# Patient Record
Sex: MALE | Race: AMERICAN INDIAN OR ALASKA NATIVE
[De-identification: names, ages, dates, MRNs, and addresses within clinical notes are randomized per-mention and may not be internally consistent; named-entity substitution may affect disease eponyms.]

---

## 2018-08-30 ENCOUNTER — HOSPITAL ENCOUNTER (EMERGENCY)
Dept: HOSPITAL 42 - ED | Age: 33
Discharge: HOME | End: 2018-08-30
Payer: COMMERCIAL

## 2018-08-30 VITALS
SYSTOLIC BLOOD PRESSURE: 130 MMHG | DIASTOLIC BLOOD PRESSURE: 70 MMHG | HEART RATE: 85 BPM | RESPIRATION RATE: 20 BRPM | TEMPERATURE: 98.4 F

## 2018-08-30 VITALS — BODY MASS INDEX: 22.6 KG/M2

## 2018-08-30 VITALS — OXYGEN SATURATION: 98 %

## 2018-08-30 DIAGNOSIS — F17.210: ICD-10-CM

## 2018-08-30 DIAGNOSIS — L02.01: Primary | ICD-10-CM

## 2018-08-30 NOTE — ED PDOC
Arrival/HPI





- General


Chief Complaint: Abnormal Skin Integrity


Time Seen by Provider: 08/30/18 17:06


Historian: Patient





- History of Present Illness


Narrative History of Present Illness (Text): 





08/30/18 17:55


33yr old male presents today with abscess to the left cheek. pt states he has 

been having on and off swelling of this area of his cheek for about one year. 

pt states this is the first time the area swelled so much. pt denies fever/

chills. denies trauma or injury. pt states he has same issue that comes and 

goes on the right cheek as well. pt states he goes to the [employer] for his 

beard. pt c/o slight pain to abscess site. no other complaints. 





Past Medical History





- Provider Review


Nursing Documentation Reviewed: Yes





- Travel History


Have you recently traveled outside [place] w/in the past 3 mons?: No





- Psychiatric


Hx Depression: No


Hx Emotional Abuse: No


Hx Physical Abuse: No


Hx Substance Use: No





- Suicidal Assessment


Feels Threatened In Home Enviroment: No





Family/Social History





- Physician Review


Nursing Documentation Reviewed: Yes


Family/Social History: Unknown Family HX


Smoking Status: Heavy Smoker > 10 Cigarettes Daily


Hx Alcohol Use: Yes (Drank tonight)


Frequency of alcohol use: Few days per week


Hx Substance Use: No


Hx Substance Use Treatment: No





Allergies/Home Meds


Allergies/Adverse Reactions: 


Allergies





No Known Allergies Allergy (Verified 08/30/18 17:19)


 











Review of Systems





- Review of Systems


Constitutional: absent: Fatigue, Fevers


Respiratory: absent: SOB, Cough


Cardiovascular: absent: Chest Pain, Palpitations


Gastrointestinal: absent: Abdominal Pain, Nausea, Vomiting


Musculoskeletal: absent: Arthralgias


Skin: Abscess


Neurological: absent: Headache, Dizziness


Psychiatric: absent: Anxiety, Depression





Physical Exam


Vital Signs Reviewed: Yes


Vital Signs











  Temp Pulse Resp BP Pulse Ox


 


 08/30/18 17:17  98.4 F  85  20  130/70  97











Temperature: Afebrile


Blood Pressure: Normal


Pulse: Regular


Respiratory Rate: Normal


Appearance: Positive for: Well-Appearing, Non-Toxic, Comfortable


Pain Distress: None


Mental Status: Positive for: Alert and Oriented X 3





- Systems Exam


Head: Present: Other (left cheek; there is a jhon sized area of swelling and 

fluctuance with small central pustule noted. no surrounding erythema; minimal 

tenderness)


Pupils: Present: PERRL


Extroacular Muscles: Present: EOMI


Mouth: Present: Moist Mucous Membranes


Neck: Present: Normal Range of Motion


Respiratory/Chest: Present: Clear to Auscultation, Good Air Exchange.  No: 

Respiratory Distress, Accessory Muscle Use


Cardiovascular: Present: Regular Rate and Rhythm, Normal S1, S2.  No: Murmurs


Neurological: Present: GCS=15, Speech Normal


Skin: Present: Warm, Dry


Psychiatric: Present: Alert, Oriented x 3





Medical Decision Making


ED Course and Treatment: 





08/30/18 18:10


Patient is nontoxic well-appearing in no distress. Vital signs are stable.





keflex po 


Bactrim DS p.o.








needle aspiration performed. 





PROCEDURE NOTE;  LEFT CHEEK;  USING STERILE TECHNIQUE; 0.5CC 1% LIDOCAINE 

INJECTED LOCALLY, ADEQUATE ANESTHESIA. AND 18GUAGE NEEDLE WAS INSERTED, 2CC OF 

PURULENT DISCHARGE REMOVED; PT TOLERATED PROCEDURE WELL. BACITRACIN AND 

DRESSING APPLIED. 








Patient was advised to use warm compresses frequently; pt was advised to f/u 

with plastic surgeon within the next 2 days. pt was advised to take medications 

as prescribed and return immediately if symptoms worsen persist or if new 

symptoms develop





Patient verbalizes understanding of discharge instructions and need for 

immediate followup.








all aspects of this case were discussed the attending of record. 





Impression: Abscess, face


Motrin one tablet every 6 hours as needed for pain


Keflex; 1 capsule 4 times daily x 7 days.


Bactrim DS: One tablet twice daily x7 days


Warm compresses and warm soaks frequently


Follow up with the plastic surgeon within the next 2 days. 


Follow up with the primary care physician within the next 2 days. 


Return immediately if symptoms worsen persist or if new symptoms develop: High 

fevers, increasing pain, increasing redness, swelling or if any other 

concerning symptoms develop.


08/30/18 18:14








- Medication Orders


Current Medication Orders: 











Discontinued Medications





Cephalexin Monohydrate (Keflex)  500 mg PO STAT STA


   PRN Reason: Protocol


   Stop: 08/30/18 17:34


   Last Admin: 08/30/18 18:03 Dose:  500 mg


Trimethoprim/Sulfamethoxazole (Bactrim Ds Tab)  1 tab PO STAT STA


   PRN Reason: Protocol


   Stop: 08/30/18 17:34


   Last Admin: 08/30/18 18:02 Dose:  1 tab











Disposition/Present on Arrival





- Present on Arrival


Any Indicators Present on Arrival: No


History of DVT/PE: No


History of Uncontrolled Diabetes: No


Urinary Catheter: No


History of Decub. Ulcer: No


History Surgical Site Infection Following: None





- Disposition


Have Diagnosis and Disposition been Completed?: Yes


Diagnosis: 


 Abscess of face





Disposition: HOME/ ROUTINE


Disposition Time: 17:36


Patient Plan: Discharge


Patient Problems: 


 Current Active Problems











Problem Status Onset


 


Abscess of face Acute  











Condition: GOOD


Discharge Instructions (ExitCare):  Boil (DC)


Additional Instructions: 


Motrin one tablet every 6 hours as needed for pain


Keflex; 1 capsule 4 times daily x 7 days.


Bactrim DS: One tablet twice daily x7 days


Warm compresses and warm soaks frequently


Follow up with the plastic surgeon within the next 2 days. 


Follow up with the primary care physician within the next 2 days. 


Return immediately if symptoms worsen persist or if new symptoms develop: High 

fevers, increasing pain, increasing redness, swelling or if any other 

concerning symptoms develop.


Prescriptions: 


Cephalexin [Keflex] 500 mg PO QID #28 capsule


Ibuprofen [Motrin] 600 mg PO Q6H PRN #20 tab


 PRN Reason: pain/fever reduction


Sulfamethoxazole/Trimethoprim [Bactrim  mg-160 mg] 1 tab PO BID #14 tab


Referrals: 


Skyler Lawler MD [Staff Provider] - Follow up with primary


Mikal Molina MD [Staff Provider] - Follow up with primary


 Service [Outside] - Follow up with primary


Chelly Dee MD [Medical Doctor] - Follow up with primary


Forms:  [facility] (English), WORK NOTE